# Patient Record
Sex: MALE | Race: WHITE | Employment: OTHER | URBAN - METROPOLITAN AREA
[De-identification: names, ages, dates, MRNs, and addresses within clinical notes are randomized per-mention and may not be internally consistent; named-entity substitution may affect disease eponyms.]

---

## 2017-01-03 ENCOUNTER — PATIENT MESSAGE (OUTPATIENT)
Dept: INTERNAL MEDICINE CLINIC | Facility: CLINIC | Age: 72
End: 2017-01-03

## 2017-01-03 RX ORDER — AMLODIPINE BESYLATE 10 MG/1
TABLET ORAL
Qty: 90 TABLET | Refills: 0 | Status: SHIPPED | OUTPATIENT
Start: 2017-01-03 | End: 2017-06-21

## 2017-01-03 RX ORDER — ATORVASTATIN CALCIUM 40 MG/1
TABLET, FILM COATED ORAL
Qty: 90 TABLET | Refills: 0 | Status: SHIPPED | OUTPATIENT
Start: 2017-01-03 | End: 2017-08-02

## 2017-01-03 RX ORDER — PIOGLITAZONEHYDROCHLORIDE 30 MG/1
TABLET ORAL
Qty: 90 TABLET | Refills: 0 | Status: SHIPPED | OUTPATIENT
Start: 2017-01-03 | End: 2017-01-25

## 2017-01-03 RX ORDER — LISINOPRIL AND HYDROCHLOROTHIAZIDE 20; 12.5 MG/1; MG/1
TABLET ORAL
Qty: 90 TABLET | Refills: 0 | Status: SHIPPED | OUTPATIENT
Start: 2017-01-03 | End: 2017-01-25

## 2017-01-03 RX ORDER — GLIMEPIRIDE 4 MG/1
TABLET ORAL
Qty: 180 TABLET | Refills: 0 | Status: SHIPPED | OUTPATIENT
Start: 2017-01-03 | End: 2017-06-21

## 2017-01-03 NOTE — TELEPHONE ENCOUNTER
From: Lyndon Chaudhry  To: Hiren Hutchison MD  Sent: 1/3/2017 12:51 PM CST  Subject: Prescription Question    As a result of [employer] changes in pharmacy network, my primary pharmacy will now be the following, and I have instituted a transfer of all prescr

## 2017-01-03 NOTE — TELEPHONE ENCOUNTER
New pharmacy request. meds pended for physician approval.   please advise as does not meet RN protocol for refill criteria  Pharmacy updated          diabetes Medications  Protocol Criteria:  · Appointment scheduled in the past 6 months or the next 3 month 02/09/2016   AST 23 02/09/2016   ALT 25 02/09/2016   BILT 0.6 02/09/2016   TP 6.9 02/09/2016   ALB 4.3 02/09/2016    02/09/2016   K 4.0 02/09/2016    02/09/2016   CO2 31 02/09/2016   GLOBULIN 2.6 02/09/2016   AGRATIO 1.7 02/09/2016   ANIONGAP 7

## 2017-01-04 ENCOUNTER — TELEPHONE (OUTPATIENT)
Dept: INTERNAL MEDICINE CLINIC | Facility: CLINIC | Age: 72
End: 2017-01-04

## 2017-01-04 DIAGNOSIS — C61 PROSTATE CANCER (HCC): Primary | ICD-10-CM

## 2017-01-04 NOTE — TELEPHONE ENCOUNTER
Spoke to [employer] @ [employer]. [employer] states patient needs a referral to see Dr. Lady Moreno for 4 month follow up. Patient is 4 months s/p seed implant with [employer].  Referral generated and routed to [facility] for approval.  Please fax

## 2017-01-04 NOTE — TELEPHONE ENCOUNTER
Referral for Dr. Yris Wilburn, was placed by Dr. Malu Rucker not Dr. Sharon Cruz. Message sent to Dr. Malu Rucekr as well as Urology staff for further advise. Thank you.

## 2017-01-04 NOTE — TELEPHONE ENCOUNTER
Nurses, please read string of bureaucratic/referral type messages and please advise if and what I need to do.   Thanks, Dr. Shira Fitzpatrick

## 2017-01-11 RX ORDER — GLIMEPIRIDE 4 MG/1
TABLET ORAL
Qty: 180 TABLET | Refills: 0 | Status: SHIPPED | OUTPATIENT
Start: 2017-01-11 | End: 2017-02-28 | Stop reason: CLARIF

## 2017-01-12 ENCOUNTER — PATIENT MESSAGE (OUTPATIENT)
Dept: INTERNAL MEDICINE CLINIC | Facility: CLINIC | Age: 72
End: 2017-01-12

## 2017-01-14 NOTE — TELEPHONE ENCOUNTER
From: Galen Meigs  To: Stephen Street MD  Sent: 1/12/2017 2:25 PM CST  Subject: Prescription Question    You sent a prescription renewal to [facility], but I had previously notified your office that due to changes in [facility] program it was Nancy

## 2017-01-14 NOTE — TELEPHONE ENCOUNTER
[facility] pharmacy deleted from profile. Original refill request came from [facility] on 1/11/17 electronically and approved by Dr Suri Wilson. Email sent to patient.

## 2017-01-25 ENCOUNTER — TELEPHONE (OUTPATIENT)
Dept: INTERNAL MEDICINE CLINIC | Facility: CLINIC | Age: 72
End: 2017-01-25

## 2017-01-25 RX ORDER — PIOGLITAZONEHYDROCHLORIDE 30 MG/1
TABLET ORAL
Qty: 90 TABLET | Refills: 0 | Status: SHIPPED | OUTPATIENT
Start: 2017-01-25 | End: 2017-12-04

## 2017-01-25 RX ORDER — ATORVASTATIN CALCIUM 40 MG/1
TABLET, FILM COATED ORAL
Qty: 90 TABLET | Refills: 0 | Status: SHIPPED | OUTPATIENT
Start: 2017-01-25 | End: 2017-02-28 | Stop reason: CLARIF

## 2017-01-25 RX ORDER — PIOGLITAZONEHYDROCHLORIDE 30 MG/1
TABLET ORAL
Qty: 90 TABLET | Refills: 0 | Status: SHIPPED | OUTPATIENT
Start: 2017-01-25 | End: 2017-02-28 | Stop reason: CLARIF

## 2017-01-25 RX ORDER — ATORVASTATIN CALCIUM 40 MG/1
TABLET, FILM COATED ORAL
Qty: 90 TABLET | Refills: 0 | Status: SHIPPED | OUTPATIENT
Start: 2017-01-25 | End: 2017-01-25

## 2017-01-25 RX ORDER — LISINOPRIL AND HYDROCHLOROTHIAZIDE 20; 12.5 MG/1; MG/1
TABLET ORAL
Qty: 90 TABLET | Refills: 0 | Status: SHIPPED | OUTPATIENT
Start: 2017-01-25 | End: 2017-02-28 | Stop reason: CLARIF

## 2017-01-25 RX ORDER — LISINOPRIL AND HYDROCHLOROTHIAZIDE 20; 12.5 MG/1; MG/1
TABLET ORAL
Qty: 90 TABLET | Refills: 0 | Status: SHIPPED | OUTPATIENT
Start: 2017-01-25 | End: 2019-02-11

## 2017-01-25 NOTE — TELEPHONE ENCOUNTER
Pt is calling state that the prescription sarah to the wrong pharm pt state that it was suppose to go to [facility]

## 2017-02-17 ENCOUNTER — PATIENT MESSAGE (OUTPATIENT)
Dept: INTERNAL MEDICINE CLINIC | Facility: CLINIC | Age: 72
End: 2017-02-17

## 2017-02-17 DIAGNOSIS — Z01.00 DIABETIC EYE EXAM (HCC): Primary | ICD-10-CM

## 2017-02-17 DIAGNOSIS — E11.9 DIABETIC EYE EXAM (HCC): Primary | ICD-10-CM

## 2017-02-20 ENCOUNTER — PATIENT OUTREACH (OUTPATIENT)
Dept: INTERNAL MEDICINE CLINIC | Facility: CLINIC | Age: 72
End: 2017-02-20

## 2017-02-20 DIAGNOSIS — E11.9 TYPE 2 DIABETES MELLITUS WITHOUT COMPLICATION, WITH LONG-TERM CURRENT USE OF INSULIN (HCC): Primary | ICD-10-CM

## 2017-02-20 DIAGNOSIS — Z79.4 TYPE 2 DIABETES MELLITUS WITHOUT COMPLICATION, WITH LONG-TERM CURRENT USE OF INSULIN (HCC): Primary | ICD-10-CM

## 2017-02-20 NOTE — PROGRESS NOTES
Spoke with pt. He agrees to have labs completed. Orders entered , instructed to fast for blood test.    PLAN:  Follow up in 1 month to review results.

## 2017-02-21 NOTE — TELEPHONE ENCOUNTER
From: Norval Goldmann  To: Manan Montez MD  Sent: 2/17/2017 9:39 AM CST  Subject: Non-Urgent Medical Question    Dr Clay Schaefer  I am due for annual physical and will make appointment.  Please request whatever tests are needed at this point and I will get them a

## 2017-02-23 ENCOUNTER — LAB ENCOUNTER (OUTPATIENT)
Dept: LAB | Age: 72
End: 2017-02-23
Attending: INTERNAL MEDICINE
Payer: MEDICARE

## 2017-02-23 DIAGNOSIS — E11.9 TYPE 2 DIABETES MELLITUS WITHOUT COMPLICATION, WITH LONG-TERM CURRENT USE OF INSULIN (HCC): ICD-10-CM

## 2017-02-23 DIAGNOSIS — Z79.4 TYPE 2 DIABETES MELLITUS WITHOUT COMPLICATION, WITH LONG-TERM CURRENT USE OF INSULIN (HCC): ICD-10-CM

## 2017-02-23 LAB
ALBUMIN SERPL BCP-MCNC: 3.9 G/DL (ref 3.5–4.8)
ALBUMIN/GLOB SERPL: 1.6 {RATIO} (ref 1–2)
ALP SERPL-CCNC: 70 U/L (ref 32–100)
ALT SERPL-CCNC: 156 U/L (ref 17–63)
ANION GAP SERPL CALC-SCNC: 10 MMOL/L (ref 0–18)
AST SERPL-CCNC: 65 U/L (ref 15–41)
BASOPHILS # BLD: 0.1 K/UL (ref 0–0.2)
BASOPHILS NFR BLD: 1 %
BILIRUB SERPL-MCNC: 0.7 MG/DL (ref 0.3–1.2)
BUN SERPL-MCNC: 13 MG/DL (ref 8–20)
BUN/CREAT SERPL: 17.1 (ref 10–20)
CALCIUM SERPL-MCNC: 8.8 MG/DL (ref 8.5–10.5)
CHLORIDE SERPL-SCNC: 104 MMOL/L (ref 95–110)
CHOLEST SERPL-MCNC: 121 MG/DL (ref 110–200)
CO2 SERPL-SCNC: 27 MMOL/L (ref 22–32)
CREAT SERPL-MCNC: 0.76 MG/DL (ref 0.5–1.5)
CREAT UR-MCNC: 140.5 MG/DL
EOSINOPHIL # BLD: 0.1 K/UL (ref 0–0.7)
EOSINOPHIL NFR BLD: 1 %
ERYTHROCYTE [DISTWIDTH] IN BLOOD BY AUTOMATED COUNT: 14.8 % (ref 11–15)
GLOBULIN PLAS-MCNC: 2.5 G/DL (ref 2.5–3.7)
GLUCOSE SERPL-MCNC: 89 MG/DL (ref 70–99)
HBA1C MFR BLD: 8.6 % (ref 4–6)
HCT VFR BLD AUTO: 42.7 % (ref 41–52)
HDLC SERPL-MCNC: 29 MG/DL
HGB BLD-MCNC: 13.8 G/DL (ref 13.5–17.5)
LDLC SERPL CALC-MCNC: 61 MG/DL (ref 0–99)
LYMPHOCYTES # BLD: 2.3 K/UL (ref 1–4)
LYMPHOCYTES NFR BLD: 22 %
MCH RBC QN AUTO: 25.4 PG (ref 27–32)
MCHC RBC AUTO-ENTMCNC: 32.3 G/DL (ref 32–37)
MCV RBC AUTO: 78.7 FL (ref 80–100)
MICROALBUMIN UR-MCNC: 1.3 MG/DL (ref 0–1.8)
MICROALBUMIN/CREAT UR: 9.3 MG/G{CREAT} (ref 0–20)
MONOCYTES # BLD: 0.9 K/UL (ref 0–1)
MONOCYTES NFR BLD: 9 %
NEUTROPHILS # BLD AUTO: 6.9 K/UL (ref 1.8–7.7)
NEUTROPHILS NFR BLD: 67 %
NONHDLC SERPL-MCNC: 92 MG/DL
OSMOLALITY UR CALC.SUM OF ELEC: 292 MOSM/KG (ref 275–295)
PLATELET # BLD AUTO: 180 K/UL (ref 140–400)
PMV BLD AUTO: 10.2 FL (ref 7.4–10.3)
POTASSIUM SERPL-SCNC: 3.5 MMOL/L (ref 3.3–5.1)
PROT SERPL-MCNC: 6.4 G/DL (ref 5.9–8.4)
RBC # BLD AUTO: 5.43 M/UL (ref 4.5–5.9)
SODIUM SERPL-SCNC: 141 MMOL/L (ref 136–144)
TRIGL SERPL-MCNC: 157 MG/DL (ref 1–149)
WBC # BLD AUTO: 10.4 K/UL (ref 4–11)

## 2017-02-23 PROCEDURE — 36415 COLL VENOUS BLD VENIPUNCTURE: CPT

## 2017-02-23 PROCEDURE — 83036 HEMOGLOBIN GLYCOSYLATED A1C: CPT

## 2017-02-23 PROCEDURE — 82570 ASSAY OF URINE CREATININE: CPT

## 2017-02-23 PROCEDURE — 85025 COMPLETE CBC W/AUTO DIFF WBC: CPT

## 2017-02-23 PROCEDURE — 80053 COMPREHEN METABOLIC PANEL: CPT

## 2017-02-23 PROCEDURE — 80061 LIPID PANEL: CPT

## 2017-02-23 PROCEDURE — 82043 UR ALBUMIN QUANTITATIVE: CPT

## 2017-02-28 ENCOUNTER — OFFICE VISIT (OUTPATIENT)
Dept: INTERNAL MEDICINE CLINIC | Facility: CLINIC | Age: 72
End: 2017-02-28

## 2017-02-28 VITALS
BODY MASS INDEX: 25.72 KG/M2 | DIASTOLIC BLOOD PRESSURE: 56 MMHG | HEART RATE: 98 BPM | SYSTOLIC BLOOD PRESSURE: 132 MMHG | HEIGHT: 72 IN | WEIGHT: 189.88 LBS

## 2017-02-28 DIAGNOSIS — C61 ADENOCARCINOMA OF PROSTATE (HCC): ICD-10-CM

## 2017-02-28 DIAGNOSIS — Z86.010 HISTORY OF COLON POLYPS: ICD-10-CM

## 2017-02-28 DIAGNOSIS — I10 ESSENTIAL HYPERTENSION: ICD-10-CM

## 2017-02-28 DIAGNOSIS — E11.9 INSULIN-REQUIRING OR DEPENDENT TYPE II DIABETES MELLITUS (HCC): ICD-10-CM

## 2017-02-28 DIAGNOSIS — E78.00 HYPERCHOLESTEROLEMIA: ICD-10-CM

## 2017-02-28 DIAGNOSIS — H47.20 OPTIC ATROPHY: ICD-10-CM

## 2017-02-28 DIAGNOSIS — R79.89 ABNORMAL LFTS: ICD-10-CM

## 2017-02-28 DIAGNOSIS — N52.1 ERECTILE DYSFUNCTION ASSOCIATED WITH TYPE 2 DIABETES MELLITUS (HCC): ICD-10-CM

## 2017-02-28 DIAGNOSIS — E11.69 ERECTILE DYSFUNCTION ASSOCIATED WITH TYPE 2 DIABETES MELLITUS (HCC): ICD-10-CM

## 2017-02-28 DIAGNOSIS — D50.9 IRON DEFICIENCY ANEMIA, UNSPECIFIED IRON DEFICIENCY ANEMIA TYPE: ICD-10-CM

## 2017-02-28 DIAGNOSIS — K57.30 DIVERTICULOSIS OF LARGE INTESTINE WITHOUT HEMORRHAGE: ICD-10-CM

## 2017-02-28 DIAGNOSIS — Z00.00 ANNUAL PHYSICAL EXAM: Primary | ICD-10-CM

## 2017-02-28 DIAGNOSIS — Z79.4 INSULIN-REQUIRING OR DEPENDENT TYPE II DIABETES MELLITUS (HCC): ICD-10-CM

## 2017-02-28 PROCEDURE — G0439 PPPS, SUBSEQ VISIT: HCPCS | Performed by: INTERNAL MEDICINE

## 2017-02-28 PROCEDURE — 96160 PT-FOCUSED HLTH RISK ASSMT: CPT | Performed by: INTERNAL MEDICINE

## 2017-02-28 NOTE — PATIENT INSTRUCTIONS
Please increase Lantus insulin to 48 units daily. Please continue your other medications. Continue to eat healthy, and try to increase exercise and lose a modest amount of weight. Please keep scheduled appointment with Urology in April.   Please see Opht non-screening if indicated for medical reasons    Electrocardiogram date Routine EKG is not a screening covered service except at the [place] to Medicare Visit    Abdominal aortic aneurysm screening (once between ages 73-68)  No results found for this or a Please get once after your 65th birthday    Hepatitis B for Moderate/High Risk No orders found for this or any previous visit.  Medium/high risk factors:   End-stage renal disease   Hemophiliacs who received Factor VIII or IX concentrates   Clients of laith

## 2017-02-28 NOTE — PROGRESS NOTES
HPI:   Tylor Mora is a 80year old male who presents for a MA (Medicare Advantage) [facility] [place] (Once per calendar year). He feels well this afternoon, and has no specific issues for discussion. He is retired, and lives with his wife.   Accu-Cheks pe Value Date   AST 65* 02/23/2017   * 02/23/2017   CA 8.8 02/23/2017   ALB 3.9 02/23/2017   TSH 1.70 11/21/2016   CREATSERUM 0.76 02/23/2017   GLU 89 02/23/2017        CBC  (most recent labs)     Lab Results  Component Value Date   WBC 10.4 02/23/2017 (Right, 1992); cataract (Left, 11-10); and Inguinal hernia repair (Right, 8/25/2015). His family history includes Colon Cancer in his mother; Diabetes in his father, paternal grandmother, paternal uncle, and sister;  Heart Disease in his father; [employer] restaurant:  Yes   I get confused about where sounds come from:  No I misunderstand some   words in a sentence and need to ask people to repeat themselves:  No   I especially have trouble understanding the speech of women and children:    No I have trouble 10/20/2008, 10/04/2010   • Influenza Vaccine, High Dose, Preserv Free 10/12/2016   • Influenza Vaccine, Preserv Free 09/10/2012, 09/12/2013   • Pneumococcal (Prevnar 13) 02/16/2016   • TDAP 01/14/2013   • Zoster (Shingles) 01/14/2013       ASSESSMENT AND O patient is instructed to get our office a copy of it for scanning into [employer]neview on file in Epic:    Noemi Cook has a Power of  for [employer] on file in [place].             PLAN:  The patient indicates understanding o 1-Yes    Does pain affect your day to day activities?: 0-No     Have you had any memory issues?: 0-No    Fall/Risk Scorin    Scoring Interpretation: 0 - 3 No Risk     Depression Screening (PHQ-2/PHQ-9): Over the LAST 2 WEEKS   Little interest or pleasu Fecal Occult Blood Annually No results found for: FOB No flowsheet data found.     Glaucoma Screening      Ophthalmology Visit Annually: Diabetics, FHx Glaucoma, AA>50, > 65 Data entered on: 1/9/2017   Last Dilated Eye Exam 2/26/2016       Prostate

## 2017-04-12 ENCOUNTER — APPOINTMENT (OUTPATIENT)
Dept: LAB | Age: 72
End: 2017-04-12
Attending: UROLOGY
Payer: MEDICARE

## 2017-04-12 DIAGNOSIS — C61 PROSTATE CANCER (HCC): ICD-10-CM

## 2017-04-12 PROCEDURE — 36415 COLL VENOUS BLD VENIPUNCTURE: CPT

## 2017-04-12 PROCEDURE — 84153 ASSAY OF PSA TOTAL: CPT

## 2017-04-17 ENCOUNTER — OFFICE VISIT (OUTPATIENT)
Dept: SURGERY | Facility: CLINIC | Age: 72
End: 2017-04-17

## 2017-04-17 VITALS
RESPIRATION RATE: 16 BRPM | DIASTOLIC BLOOD PRESSURE: 60 MMHG | BODY MASS INDEX: 25.06 KG/M2 | SYSTOLIC BLOOD PRESSURE: 140 MMHG | TEMPERATURE: 97 F | HEIGHT: 72 IN | HEART RATE: 98 BPM | WEIGHT: 185 LBS

## 2017-04-17 DIAGNOSIS — R35.1 NOCTURIA: ICD-10-CM

## 2017-04-17 DIAGNOSIS — C61 PROSTATE CANCER (HCC): Primary | ICD-10-CM

## 2017-04-17 PROCEDURE — G0463 HOSPITAL OUTPT CLINIC VISIT: HCPCS | Performed by: UROLOGY

## 2017-04-17 PROCEDURE — 99214 OFFICE O/P EST MOD 30 MIN: CPT | Performed by: UROLOGY

## 2017-04-17 NOTE — PATIENT INSTRUCTIONS
.  Please keep your appointment to see Dr. Jana Rivera in 3 months    2. Visit with me in 6 months, namely October 2017; blood draw for PSA--total and free before the visit.   Please abstain from any and all sexual activity for 5 days before the actual PSA blood

## 2017-04-17 NOTE — PROGRESS NOTES
HPI:    Patient ID: Lauryn Nicole is a 80year old male. HPI     1.   Voiding Dysfunction  Patient has current AUA score of 14, moderate voiding dysfunction category, compared to previous score of 19, moderate voiding dysfunction category, on 10/12/16 pe bone pain. Respiratory: Negative for chest tightness and shortness of breath. Cardiovascular: Negative for chest pain. Gastrointestinal: Negative for diarrhea, constipation and blood in stool.    Genitourinary: Negative for dysuria and hematuria (atiya 270 tablet Rfl: 0   Atorvastatin Calcium 40 MG Oral Tab TAKE 1 TABLET BY MOUTH ONCE DAILY.  Disp: 90 tablet Rfl: 0   insulin glargine (LANTUS) 100 UNIT/ML Subcutaneous Solution INJECT 40 UNITS BY SUBCUTANEOUS ROUTE DAILY (Patient taking differently: INJECT 5.4      Imaging tests---  None performed         ASSESSMENT/PLAN:   (C61) Prostate cancer ([facility] [address]  (primary encounter diagnosis)  Plan:  9/1/2016 BRACHYTHERAPY / Seed implant  +  Cystoscopy  under general anesthesia @ [place] [address].  On YOLY the pro that the record reflects my personal performance and is accurate and complete.   Dustin Springer MD, 4/17/2017, 10:13 AM

## 2017-05-11 ENCOUNTER — PATIENT MESSAGE (OUTPATIENT)
Dept: INTERNAL MEDICINE CLINIC | Facility: CLINIC | Age: 72
End: 2017-05-11

## 2017-05-11 DIAGNOSIS — E11.9 TYPE 2 DIABETES MELLITUS WITHOUT COMPLICATION, WITH LONG-TERM CURRENT USE OF INSULIN (HCC): Primary | ICD-10-CM

## 2017-05-11 DIAGNOSIS — R79.89 ABNORMAL LFTS: ICD-10-CM

## 2017-05-11 DIAGNOSIS — Z79.4 TYPE 2 DIABETES MELLITUS WITHOUT COMPLICATION, WITH LONG-TERM CURRENT USE OF INSULIN (HCC): Primary | ICD-10-CM

## 2017-05-12 NOTE — TELEPHONE ENCOUNTER
From: Cathy Coelho  To: Amy Owusu MD  Sent: 5/11/2017 1:35 PM CDT  Subject: Visit Follow-up Question    Dr Ahumada Repress  Please order the follow-up tests you require (A1C and enzymes) and I will make an appointment for follow-up visit once I complete the b

## 2017-05-18 ENCOUNTER — APPOINTMENT (OUTPATIENT)
Dept: LAB | Age: 72
End: 2017-05-18
Attending: INTERNAL MEDICINE
Payer: MEDICARE

## 2017-05-18 DIAGNOSIS — E11.9 TYPE 2 DIABETES MELLITUS WITHOUT COMPLICATION, WITH LONG-TERM CURRENT USE OF INSULIN (HCC): ICD-10-CM

## 2017-05-18 DIAGNOSIS — Z79.4 TYPE 2 DIABETES MELLITUS WITHOUT COMPLICATION, WITH LONG-TERM CURRENT USE OF INSULIN (HCC): ICD-10-CM

## 2017-05-18 DIAGNOSIS — R79.89 ABNORMAL LFTS: ICD-10-CM

## 2017-05-18 PROCEDURE — 84466 ASSAY OF TRANSFERRIN: CPT

## 2017-05-18 PROCEDURE — 80076 HEPATIC FUNCTION PANEL: CPT

## 2017-05-18 PROCEDURE — 83540 ASSAY OF IRON: CPT

## 2017-05-18 PROCEDURE — 82728 ASSAY OF FERRITIN: CPT

## 2017-05-18 PROCEDURE — 83036 HEMOGLOBIN GLYCOSYLATED A1C: CPT

## 2017-05-18 PROCEDURE — 36415 COLL VENOUS BLD VENIPUNCTURE: CPT

## 2017-05-18 PROCEDURE — 80074 ACUTE HEPATITIS PANEL: CPT

## 2017-05-18 PROCEDURE — 86038 ANTINUCLEAR ANTIBODIES: CPT

## 2017-05-22 ENCOUNTER — OFFICE VISIT (OUTPATIENT)
Dept: INTERNAL MEDICINE CLINIC | Facility: CLINIC | Age: 72
End: 2017-05-22

## 2017-05-22 VITALS
HEIGHT: 72 IN | DIASTOLIC BLOOD PRESSURE: 58 MMHG | WEIGHT: 191 LBS | BODY MASS INDEX: 25.87 KG/M2 | HEART RATE: 97 BPM | SYSTOLIC BLOOD PRESSURE: 132 MMHG

## 2017-05-22 DIAGNOSIS — C61 ADENOCARCINOMA OF PROSTATE (HCC): ICD-10-CM

## 2017-05-22 DIAGNOSIS — I10 ESSENTIAL HYPERTENSION: ICD-10-CM

## 2017-05-22 DIAGNOSIS — Z79.4 ENCOUNTER FOR LONG-TERM (CURRENT) USE OF INSULIN (HCC): ICD-10-CM

## 2017-05-22 DIAGNOSIS — R79.89 ABNORMAL LFTS: ICD-10-CM

## 2017-05-22 DIAGNOSIS — E11.69 ERECTILE DYSFUNCTION ASSOCIATED WITH TYPE 2 DIABETES MELLITUS (HCC): ICD-10-CM

## 2017-05-22 DIAGNOSIS — N52.1 ERECTILE DYSFUNCTION ASSOCIATED WITH TYPE 2 DIABETES MELLITUS (HCC): ICD-10-CM

## 2017-05-22 PROCEDURE — G0463 HOSPITAL OUTPT CLINIC VISIT: HCPCS | Performed by: INTERNAL MEDICINE

## 2017-05-22 PROCEDURE — 99213 OFFICE O/P EST LOW 20 MIN: CPT | Performed by: INTERNAL MEDICINE

## 2017-05-22 NOTE — PATIENT INSTRUCTIONS
Please increase Lantus insulin to 52 units daily. Please continue your other medications. Return visit in 3 months.

## 2017-05-22 NOTE — PROGRESS NOTES
Thompson Schirmer is a 80year old male. Patient presents with:  Diabetes  Hypertension  Hypercholesterolemia    HPI:   Mr. Nikkie Alejandro presents this afternoon for follow-up.   At his last visit in February, dose of Lantus insulin was increased from 44 units daily to Disp:  Rfl:    MULTIPLE VITAMIN OR Take one tablet by mouth daily Disp:  Rfl:        Ragweed                    Past Medical History   Diagnosis Date   • Insulin-requiring or dependent type II diabetes mellitus ([facility] [address] 1992   • Hypertension 2000   • Hypercho

## 2017-06-21 RX ORDER — GLIMEPIRIDE 4 MG/1
TABLET ORAL
Qty: 180 TABLET | Refills: 1 | Status: SHIPPED | OUTPATIENT
Start: 2017-06-21 | End: 2018-02-03

## 2017-06-21 RX ORDER — AMLODIPINE BESYLATE 10 MG/1
TABLET ORAL
Qty: 90 TABLET | Refills: 1 | Status: SHIPPED | OUTPATIENT
Start: 2017-06-21 | End: 2018-02-03

## 2017-07-24 RX ORDER — PIOGLITAZONEHYDROCHLORIDE 30 MG/1
TABLET ORAL
Qty: 90 TABLET | Refills: 1 | Status: SHIPPED | OUTPATIENT
Start: 2017-07-24 | End: 2018-02-03

## 2017-07-24 RX ORDER — LISINOPRIL AND HYDROCHLOROTHIAZIDE 20; 12.5 MG/1; MG/1
TABLET ORAL
Qty: 90 TABLET | Refills: 1 | Status: SHIPPED | OUTPATIENT
Start: 2017-07-24 | End: 2018-02-03

## 2017-08-02 RX ORDER — ATORVASTATIN CALCIUM 40 MG/1
TABLET, FILM COATED ORAL
Qty: 90 TABLET | Refills: 1 | Status: SHIPPED | OUTPATIENT
Start: 2017-08-02 | End: 2017-10-30

## 2017-08-02 NOTE — TELEPHONE ENCOUNTER
LOV: 5/22/17  LRF: 1/3/17    Next scheduled appt:  No future apts scheduled at this time.   Pended rx for signature

## 2017-08-02 NOTE — TELEPHONE ENCOUNTER
Per Ami they have sent this request already. Pt would like a refill on atorvastatin medication. Pharmacy: [facility] (listed)       Current Outpatient Prescriptions:  Atorvastatin Calcium 40 MG Oral Tab TAKE 1 TABLET BY MOUTH ONCE DAILY.  Disp: 90 t

## 2017-08-31 ENCOUNTER — APPOINTMENT (OUTPATIENT)
Dept: LAB | Age: 72
End: 2017-08-31
Attending: INTERNAL MEDICINE
Payer: MEDICARE

## 2017-08-31 DIAGNOSIS — E11.9 TYPE 2 DIABETES MELLITUS WITHOUT COMPLICATION, WITH LONG-TERM CURRENT USE OF INSULIN (HCC): ICD-10-CM

## 2017-08-31 DIAGNOSIS — Z79.4 TYPE 2 DIABETES MELLITUS WITHOUT COMPLICATION, WITH LONG-TERM CURRENT USE OF INSULIN (HCC): ICD-10-CM

## 2017-08-31 LAB — HBA1C MFR BLD: 7.5 % (ref 4–6)

## 2017-08-31 PROCEDURE — 83036 HEMOGLOBIN GLYCOSYLATED A1C: CPT

## 2017-08-31 PROCEDURE — 36415 COLL VENOUS BLD VENIPUNCTURE: CPT

## 2017-09-01 ENCOUNTER — TELEPHONE (OUTPATIENT)
Dept: INTERNAL MEDICINE CLINIC | Facility: CLINIC | Age: 72
End: 2017-09-01

## 2017-09-01 DIAGNOSIS — Z86.010 HISTORY OF COLON POLYPS: Primary | ICD-10-CM

## 2017-09-01 NOTE — TELEPHONE ENCOUNTER
Pt sent a message via [employer] requesting a referral to see GI for a colonoscopy, please. Please contact pt or push referral via [employer] once completed.

## 2017-09-06 ENCOUNTER — TELEPHONE (OUTPATIENT)
Dept: GASTROENTEROLOGY | Facility: CLINIC | Age: 72
End: 2017-09-06

## 2017-09-06 DIAGNOSIS — Z86.010 HX OF COLONIC POLYPS: Primary | ICD-10-CM

## 2017-09-06 NOTE — TELEPHONE ENCOUNTER
Pt should have been notified of at least 72 hour turnaround scheduling time.   Not noted below by CSS staff    Last Procedure:  8/6/2012  Last Diagnosis:  Personal history of adenomatous colon polyps  Recalled for (years):  5 years  Sedation used previously

## 2017-09-06 NOTE — TELEPHONE ENCOUNTER
The patient's chart has been reviewed. Okay to schedule pt for 5 year colonoscopy recall r/t history of adenomatous colon polyps with Dr. Jeff Newberry. Advise IV Twilight sedation with dose Colyte (eRx) preparation.      May continue all medications list

## 2017-09-13 NOTE — TELEPHONE ENCOUNTER
Scheduled for:  Colonoscopy - [id number]  Provider Name:  Dr. Faraz Montes  Date:  12/4/17  Location:  [facility]  Sedation:  IV  Time:  10:00 am (pt is aware to arrive at 9:00 am)  Prep:  Colyte, Prep instructions were given to pt over the phone, pt verbalized understanding.

## 2017-09-13 NOTE — TELEPHONE ENCOUNTER
Routed to  RNs for Dr Lizbeth Schulz to address diabetic meds prior to colonoscopy    -glimepiride  -Lantus  -metformin  -actos

## 2017-09-19 RX ORDER — INSULIN GLARGINE 100 [IU]/ML
INJECTION, SOLUTION SUBCUTANEOUS
Qty: 10 ML | Refills: 5 | Status: SHIPPED | OUTPATIENT
Start: 2017-09-19 | End: 2018-02-03

## 2017-09-19 NOTE — TELEPHONE ENCOUNTER
Please call patient. Recommend he take metformin, Actos and glimepiride as prescribed through the day prior to colonoscopy, then hold these medications on the morning of colonoscopy, resuming the next day.   Recommend he take Lantus insulin as prescribed e

## 2017-09-25 NOTE — TELEPHONE ENCOUNTER
Pt called, message per Dr mccullough.   Verbalized understanding and compliance  Also sent written directions to pt via [employer] so that he can reference them closer to procedure date

## 2017-10-04 RX ORDER — NAPROXEN SODIUM 220 MG
TABLET ORAL
Qty: 100 EACH | Refills: 3 | Status: SHIPPED | OUTPATIENT
Start: 2017-10-04 | End: 2018-10-18

## 2017-10-06 ENCOUNTER — APPOINTMENT (OUTPATIENT)
Dept: LAB | Age: 72
End: 2017-10-06
Attending: UROLOGY
Payer: MEDICARE

## 2017-10-06 DIAGNOSIS — C61 PROSTATE CANCER (HCC): ICD-10-CM

## 2017-10-06 PROCEDURE — 84153 ASSAY OF PSA TOTAL: CPT

## 2017-10-06 PROCEDURE — 36415 COLL VENOUS BLD VENIPUNCTURE: CPT

## 2017-10-23 ENCOUNTER — OFFICE VISIT (OUTPATIENT)
Dept: SURGERY | Facility: CLINIC | Age: 72
End: 2017-10-23

## 2017-10-23 VITALS
DIASTOLIC BLOOD PRESSURE: 60 MMHG | RESPIRATION RATE: 16 BRPM | WEIGHT: 184 LBS | BODY MASS INDEX: 24.92 KG/M2 | SYSTOLIC BLOOD PRESSURE: 130 MMHG | HEIGHT: 72 IN | HEART RATE: 93 BPM | TEMPERATURE: 98 F

## 2017-10-23 DIAGNOSIS — C61 PROSTATE CANCER (HCC): Primary | ICD-10-CM

## 2017-10-23 DIAGNOSIS — R35.1 NOCTURIA: ICD-10-CM

## 2017-10-23 PROCEDURE — G0463 HOSPITAL OUTPT CLINIC VISIT: HCPCS | Performed by: UROLOGY

## 2017-10-23 PROCEDURE — 99214 OFFICE O/P EST MOD 30 MIN: CPT | Performed by: UROLOGY

## 2017-10-23 NOTE — PATIENT INSTRUCTIONS
1.  If you wish to wake up less often at night, restrict fluids for 3 hours before bedtime; avoid alcohol and caffeinated beverages in the evening which would make nocturia (waking up at night) worse.     2.  Keep your appointment to see Dr. Jana Rivera 3 months can’t. Prostate cancer may grow into nearby organs or spread to nearby lymph nodes. Lymph nodes are small organs around the body that are part of the immune system. In some cases, the cancer spreads to bones or organs in distant parts of the body.  This is

## 2017-10-23 NOTE — PROGRESS NOTES
HPI:    Patient ID: Raz Freedman is a 71year old male. HPI     1.   Voiding Dysfunction  Patient has current AUA score of 10, moderate voiding dysfunction category, improved compared to previous score of 14, moderate voiding dysfunction category, on 4/ prostate nml, 20 grams, no nodules or indurations. Review of Systems   Constitutional: Negative for fever. HENT: Negative for voice change. Respiratory: Negative for chest tightness and shortness of breath.     Cardiovascular: Negative for chest hector PACKS) 240 g Oral Recon Soln As directed per GI consult notes Disp: 1 Bottle Rfl: 0   atorvastatin 40 MG Oral Tab TAKE 1 TABLET BY MOUTH ONCE DAILY.  Disp: 90 tablet Rfl: 1   PIOGLITAZONE HCL 30 MG Oral Tab TAKE 1 TABLET BY MOUTH EVERY DAY Disp: 90 tablet R TempSrc: Oral   Weight: 184 lb (83.5 kg)   Height: 6' (1.829 m)         Body mass index is 24.95 kg/m².     Laboratories  10/06/2017 PSA = 0.1  4/12/17 PSA = 0.1  5/9/2016 PSA = 5.5; % Free PSA = 20 January 2017 PSA = 0.15 (as per pt; blood test ordere Avoid any and all sexual activity for 5 days before the actual PSA blood draw. No orders of the defined types were placed in this encounter.       Meds This Visit:  No prescriptions requested or ordered in this encounter    Imaging & Referrals:  None

## 2017-10-30 RX ORDER — ATORVASTATIN CALCIUM 40 MG/1
TABLET, FILM COATED ORAL
Qty: 90 TABLET | Refills: 0 | Status: SHIPPED | OUTPATIENT
Start: 2017-10-30 | End: 2019-02-11

## 2017-11-17 ENCOUNTER — PATIENT MESSAGE (OUTPATIENT)
Dept: INTERNAL MEDICINE CLINIC | Facility: CLINIC | Age: 72
End: 2017-11-17

## 2017-11-17 DIAGNOSIS — C61 MALIGNANT NEOPLASM OF PROSTATE (HCC): Primary | ICD-10-CM

## 2017-11-21 NOTE — TELEPHONE ENCOUNTER
From: [place] Wells  To: Kely Pimentel MD  Sent: 11/17/2017 9:24 AM CST  Subject: Referral Request    In January I am supposed to visit Dr. Ray Lau at [facility] for a follow-up on the prostrate seed implant and I will need a referral

## 2017-12-04 ENCOUNTER — HOSPITAL ENCOUNTER (OUTPATIENT)
Facility: HOSPITAL | Age: 72
Setting detail: HOSPITAL OUTPATIENT SURGERY
Discharge: HOME | End: 2017-12-04
Attending: INTERNAL MEDICINE | Admitting: INTERNAL MEDICINE
Payer: MEDICARE

## 2017-12-04 ENCOUNTER — SURGERY (OUTPATIENT)
Age: 72
End: 2017-12-04

## 2017-12-04 VITALS
SYSTOLIC BLOOD PRESSURE: 133 MMHG | DIASTOLIC BLOOD PRESSURE: 73 MMHG | HEIGHT: 72 IN | WEIGHT: 180 LBS | BODY MASS INDEX: 24.38 KG/M2 | OXYGEN SATURATION: 95 % | HEART RATE: 86 BPM | RESPIRATION RATE: 18 BRPM

## 2017-12-04 DIAGNOSIS — Z86.010 HX OF COLONIC POLYPS: ICD-10-CM

## 2017-12-04 PROCEDURE — 0DBK8ZX EXCISION OF ASCENDING COLON, VIA NATURAL OR ARTIFICIAL OPENING ENDOSCOPIC, DIAGNOSTIC: ICD-10-PCS | Performed by: INTERNAL MEDICINE

## 2017-12-04 PROCEDURE — 45380 COLONOSCOPY AND BIOPSY: CPT | Performed by: INTERNAL MEDICINE

## 2017-12-04 PROCEDURE — 45385 COLONOSCOPY W/LESION REMOVAL: CPT | Performed by: INTERNAL MEDICINE

## 2017-12-04 PROCEDURE — 0DBH8ZX EXCISION OF CECUM, VIA NATURAL OR ARTIFICIAL OPENING ENDOSCOPIC, DIAGNOSTIC: ICD-10-PCS | Performed by: INTERNAL MEDICINE

## 2017-12-04 PROCEDURE — 99153 MOD SED SAME PHYS/QHP EA: CPT | Performed by: INTERNAL MEDICINE

## 2017-12-04 PROCEDURE — 99152 MOD SED SAME PHYS/QHP 5/>YRS: CPT | Performed by: INTERNAL MEDICINE

## 2017-12-04 RX ORDER — MIDAZOLAM HYDROCHLORIDE 1 MG/ML
INJECTION INTRAMUSCULAR; INTRAVENOUS
Status: DISCONTINUED | OUTPATIENT
Start: 2017-12-04 | End: 2017-12-04

## 2017-12-04 RX ORDER — SODIUM CHLORIDE, SODIUM LACTATE, POTASSIUM CHLORIDE, CALCIUM CHLORIDE 600; 310; 30; 20 MG/100ML; MG/100ML; MG/100ML; MG/100ML
INJECTION, SOLUTION INTRAVENOUS CONTINUOUS
Status: DISCONTINUED | OUTPATIENT
Start: 2017-12-04 | End: 2017-12-04

## 2017-12-04 RX ORDER — MIDAZOLAM HYDROCHLORIDE 1 MG/ML
1 INJECTION INTRAMUSCULAR; INTRAVENOUS EVERY 5 MIN PRN
Status: DISCONTINUED | OUTPATIENT
Start: 2017-12-04 | End: 2017-12-04

## 2017-12-04 RX ORDER — SODIUM CHLORIDE 0.9 % (FLUSH) 0.9 %
10 SYRINGE (ML) INJECTION AS NEEDED
Status: DISCONTINUED | OUTPATIENT
Start: 2017-12-04 | End: 2017-12-04

## 2017-12-04 NOTE — OPERATIVE REPORT
[facility] Endoscopy Report      Date of Procedure:  12/04/17      Preoperative Diagnosis:  1. Personal history of adenomatous colon polyps  2. Colorectal cancer screening        Postoperative Diagnosis:  1.   Small ascending colon polyp 5–6 mm sessile polyp which was cold snare excised and retrieved via suction. No ongoing bleeding.   There were no other colonic polyps, definite diverticula, (minimal diverticulosis was seen on the patient's last colonoscopy) mass lesions, vascular anomali

## 2017-12-04 NOTE — H&P
History & Physical Examination    Patient Name: Deacon Arriola  MRN: [id number]  [facility]: [id number]  YOB: 1945    Diagnosis: Personal history of adenomatous colon polyps, colorectal cancer screening        Prescriptions Prior to Admission:  ATOR Continuous   Midazolam HCl (VERSED) 2 MG/2ML injection 1 mg 1 mg Intravenous Q5 Min PRN   fentaNYL citrate (SUBLIMAZE) 0.05 MG/ML injection 25 mcg 25 mcg Intravenous Q5 Min PRN       Allergies:   Ragweed                     Past Medical History:   Maninder Jin

## 2017-12-11 ENCOUNTER — TELEPHONE (OUTPATIENT)
Dept: GASTROENTEROLOGY | Facility: CLINIC | Age: 72
End: 2017-12-11

## 2017-12-11 DIAGNOSIS — K63.9 NODULE OF COLON: Primary | ICD-10-CM

## 2017-12-12 NOTE — TELEPHONE ENCOUNTER
Future Appointments  Date Time Provider Sharon Blaire   12/13/2017 1:30 PM [facility] CT RM1 [facility].  CT EM [place]

## 2017-12-12 NOTE — TELEPHONE ENCOUNTER
GI RN staff: I have ordered a CT scan of the abdomen and pelvis for a nodule versus extrinsic compression of the cecum seen at colonoscopy. I am uncertain whether the patient requires a preauthorization. Please contact the patient when he may schedule.

## 2017-12-12 NOTE — TELEPHONE ENCOUNTER
Pt contacted and informed that he may schedule his CT scan at this time. I provided him the number to central scheduling of [phone] so that he may schedule, pt verbalized understanding and states he will arrange this today.  No further questions at [facility]

## 2017-12-13 ENCOUNTER — TELEPHONE (OUTPATIENT)
Dept: GASTROENTEROLOGY | Facility: CLINIC | Age: 72
End: 2017-12-13

## 2017-12-13 ENCOUNTER — HOSPITAL ENCOUNTER (OUTPATIENT)
Dept: CT IMAGING | Age: 72
Discharge: HOME | End: 2017-12-13
Attending: INTERNAL MEDICINE
Payer: MEDICARE

## 2017-12-13 DIAGNOSIS — K63.9 NODULE OF COLON: ICD-10-CM

## 2017-12-13 PROCEDURE — 82565 ASSAY OF CREATININE: CPT

## 2017-12-13 PROCEDURE — 74177 CT ABD & PELVIS W/CONTRAST: CPT | Performed by: INTERNAL MEDICINE

## 2017-12-13 NOTE — TELEPHONE ENCOUNTER
----- Message from Hernandez Day MD sent at 12/11/2017  7:53 PM CST -----  I spoke to the patient. He is feeling well. He had a solitary small tubular adenoma removed. I have discussed the significance.   I have recommended a follow-up colonoscopy

## 2018-01-09 ENCOUNTER — TELEPHONE (OUTPATIENT)
Dept: INTERNAL MEDICINE CLINIC | Facility: CLINIC | Age: 73
End: 2018-01-09

## 2018-01-12 ENCOUNTER — TELEPHONE (OUTPATIENT)
Dept: INTERNAL MEDICINE CLINIC | Facility: CLINIC | Age: 73
End: 2018-01-12

## 2018-01-12 DIAGNOSIS — C61 PROSTATE CANCER (HCC): Primary | ICD-10-CM

## 2018-01-29 ENCOUNTER — TELEPHONE (OUTPATIENT)
Dept: INTERNAL MEDICINE CLINIC | Facility: CLINIC | Age: 73
End: 2018-01-29

## 2018-01-29 DIAGNOSIS — E11.9 INSULIN-REQUIRING OR DEPENDENT TYPE II DIABETES MELLITUS (HCC): Primary | ICD-10-CM

## 2018-01-29 DIAGNOSIS — Z79.4 INSULIN-REQUIRING OR DEPENDENT TYPE II DIABETES MELLITUS (HCC): Primary | ICD-10-CM

## 2018-01-29 NOTE — TELEPHONE ENCOUNTER
Patient returned call and scheduled AHA for 2/28/18. If appropriate please sign pending lab orders. Thanks.

## 2018-02-03 RX ORDER — PIOGLITAZONEHYDROCHLORIDE 30 MG/1
TABLET ORAL
Qty: 90 TABLET | Refills: 0 | Status: SHIPPED | OUTPATIENT
Start: 2018-02-03 | End: 2018-05-04

## 2018-02-03 RX ORDER — AMLODIPINE BESYLATE 10 MG/1
TABLET ORAL
Qty: 90 TABLET | Refills: 0 | Status: SHIPPED | OUTPATIENT
Start: 2018-02-03 | End: 2018-05-02

## 2018-02-03 RX ORDER — INSULIN GLARGINE 100 [IU]/ML
INJECTION, SOLUTION SUBCUTANEOUS
Qty: 10 ML | Refills: 0 | Status: SHIPPED | OUTPATIENT
Start: 2018-02-03 | End: 2018-03-02

## 2018-02-03 RX ORDER — ATORVASTATIN CALCIUM 40 MG/1
TABLET, FILM COATED ORAL
Qty: 90 TABLET | Refills: 0 | Status: SHIPPED | OUTPATIENT
Start: 2018-02-03 | End: 2018-02-28

## 2018-02-03 RX ORDER — LISINOPRIL AND HYDROCHLOROTHIAZIDE 20; 12.5 MG/1; MG/1
TABLET ORAL
Qty: 90 TABLET | Refills: 0 | Status: SHIPPED | OUTPATIENT
Start: 2018-02-03 | End: 2018-02-28

## 2018-02-03 RX ORDER — GLIMEPIRIDE 4 MG/1
TABLET ORAL
Qty: 180 TABLET | Refills: 0 | Status: SHIPPED | OUTPATIENT
Start: 2018-02-03 | End: 2018-05-02

## 2018-02-05 ENCOUNTER — PATIENT MESSAGE (OUTPATIENT)
Dept: INTERNAL MEDICINE CLINIC | Facility: CLINIC | Age: 73
End: 2018-02-05

## 2018-02-05 DIAGNOSIS — H47.20 OPTIC ATROPHY: Primary | ICD-10-CM

## 2018-02-05 NOTE — TELEPHONE ENCOUNTER
From: Lyndon Chaudhry  To: Hiren Hutchison MD  Sent: 2/5/2018 2:25 PM CST  Subject: Referral Request    Dr Jefferson Delvalle    I am due for the annual dilated retinal exam, and would appreciate your referral to Dr Inocencia Mora at [facility] where I have been a

## 2018-02-22 ENCOUNTER — APPOINTMENT (OUTPATIENT)
Dept: LAB | Age: 73
End: 2018-02-22
Attending: INTERNAL MEDICINE
Payer: MEDICARE

## 2018-02-22 DIAGNOSIS — E11.9 INSULIN-REQUIRING OR DEPENDENT TYPE II DIABETES MELLITUS (HCC): ICD-10-CM

## 2018-02-22 DIAGNOSIS — Z79.4 INSULIN-REQUIRING OR DEPENDENT TYPE II DIABETES MELLITUS (HCC): ICD-10-CM

## 2018-02-22 DIAGNOSIS — Z79.4 CONTROLLED TYPE 2 DIABETES MELLITUS WITH HYPERGLYCEMIA, WITH LONG-TERM CURRENT USE OF INSULIN (HCC): ICD-10-CM

## 2018-02-22 DIAGNOSIS — E11.65 CONTROLLED TYPE 2 DIABETES MELLITUS WITH HYPERGLYCEMIA, WITH LONG-TERM CURRENT USE OF INSULIN (HCC): ICD-10-CM

## 2018-02-22 LAB
ALBUMIN SERPL BCP-MCNC: 4.1 G/DL (ref 3.5–4.8)
ALBUMIN/GLOB SERPL: 1.4 {RATIO} (ref 1–2)
ALP SERPL-CCNC: 72 U/L (ref 32–100)
ALT SERPL-CCNC: 27 U/L (ref 17–63)
ANION GAP SERPL CALC-SCNC: 9 MMOL/L (ref 0–18)
AST SERPL-CCNC: 23 U/L (ref 15–41)
BILIRUB SERPL-MCNC: 0.7 MG/DL (ref 0.3–1.2)
BUN SERPL-MCNC: 16 MG/DL (ref 8–20)
BUN/CREAT SERPL: 19 (ref 10–20)
CALCIUM SERPL-MCNC: 9.2 MG/DL (ref 8.5–10.5)
CHLORIDE SERPL-SCNC: 104 MMOL/L (ref 95–110)
CHOLEST SERPL-MCNC: 124 MG/DL (ref 110–200)
CO2 SERPL-SCNC: 28 MMOL/L (ref 22–32)
CREAT SERPL-MCNC: 0.84 MG/DL (ref 0.5–1.5)
CREAT UR-MCNC: 202.3 MG/DL
ERYTHROCYTE [DISTWIDTH] IN BLOOD BY AUTOMATED COUNT: 14 % (ref 11–15)
GLOBULIN PLAS-MCNC: 2.9 G/DL (ref 2.5–3.7)
GLUCOSE SERPL-MCNC: 68 MG/DL (ref 70–99)
HBA1C MFR BLD: 7.6 % (ref 4–6)
HCT VFR BLD AUTO: 43.8 % (ref 41–52)
HDLC SERPL-MCNC: 35 MG/DL
HGB BLD-MCNC: 14.4 G/DL (ref 13.5–17.5)
LDLC SERPL CALC-MCNC: 63 MG/DL (ref 0–99)
MCH RBC QN AUTO: 26.4 PG (ref 27–32)
MCHC RBC AUTO-ENTMCNC: 32.9 G/DL (ref 32–37)
MCV RBC AUTO: 80.4 FL (ref 80–100)
MICROALBUMIN UR-MCNC: 2.4 MG/DL (ref 0–1.8)
MICROALBUMIN/CREAT UR: 11.9 MG/G{CREAT} (ref 0–20)
NONHDLC SERPL-MCNC: 89 MG/DL
OSMOLALITY UR CALC.SUM OF ELEC: 291 MOSM/KG (ref 275–295)
PATIENT FASTING: YES
PLATELET # BLD AUTO: 219 K/UL (ref 140–400)
PMV BLD AUTO: 9.3 FL (ref 7.4–10.3)
POTASSIUM SERPL-SCNC: 3.9 MMOL/L (ref 3.3–5.1)
PROT SERPL-MCNC: 7 G/DL (ref 5.9–8.4)
RBC # BLD AUTO: 5.45 M/UL (ref 4.5–5.9)
SODIUM SERPL-SCNC: 141 MMOL/L (ref 136–144)
TRIGL SERPL-MCNC: 129 MG/DL (ref 1–149)
WBC # BLD AUTO: 9.6 K/UL (ref 4–11)

## 2018-02-22 PROCEDURE — 82043 UR ALBUMIN QUANTITATIVE: CPT

## 2018-02-22 PROCEDURE — 85027 COMPLETE CBC AUTOMATED: CPT

## 2018-02-22 PROCEDURE — 83036 HEMOGLOBIN GLYCOSYLATED A1C: CPT

## 2018-02-22 PROCEDURE — 82570 ASSAY OF URINE CREATININE: CPT

## 2018-02-22 PROCEDURE — 80053 COMPREHEN METABOLIC PANEL: CPT

## 2018-02-22 PROCEDURE — 80061 LIPID PANEL: CPT

## 2018-02-22 PROCEDURE — 36415 COLL VENOUS BLD VENIPUNCTURE: CPT

## 2018-02-27 PROBLEM — N39.8 VOIDING DYSFUNCTION: Status: ACTIVE | Noted: 2018-02-27

## 2018-02-27 PROBLEM — H54.40 BLIND RIGHT EYE: Status: ACTIVE | Noted: 2018-02-27

## 2018-02-27 PROBLEM — R35.1 NOCTURIA: Status: ACTIVE | Noted: 2018-02-27

## 2018-02-28 ENCOUNTER — OFFICE VISIT (OUTPATIENT)
Dept: INTERNAL MEDICINE CLINIC | Facility: CLINIC | Age: 73
End: 2018-02-28

## 2018-02-28 VITALS
SYSTOLIC BLOOD PRESSURE: 134 MMHG | HEIGHT: 72 IN | WEIGHT: 187.88 LBS | BODY MASS INDEX: 25.45 KG/M2 | HEART RATE: 94 BPM | DIASTOLIC BLOOD PRESSURE: 62 MMHG | RESPIRATION RATE: 20 BRPM | TEMPERATURE: 98 F

## 2018-02-28 DIAGNOSIS — H47.20 OPTIC ATROPHY: ICD-10-CM

## 2018-02-28 DIAGNOSIS — Z79.4 TYPE 2 DIABETES MELLITUS WITHOUT COMPLICATION, WITH LONG-TERM CURRENT USE OF INSULIN (HCC): ICD-10-CM

## 2018-02-28 DIAGNOSIS — E78.00 HYPERCHOLESTEROLEMIA: ICD-10-CM

## 2018-02-28 DIAGNOSIS — C61 ADENOCARCINOMA OF PROSTATE (HCC): ICD-10-CM

## 2018-02-28 DIAGNOSIS — D50.9 IRON DEFICIENCY ANEMIA, UNSPECIFIED IRON DEFICIENCY ANEMIA TYPE: ICD-10-CM

## 2018-02-28 DIAGNOSIS — N52.1 ERECTILE DYSFUNCTION ASSOCIATED WITH TYPE 2 DIABETES MELLITUS (HCC): ICD-10-CM

## 2018-02-28 DIAGNOSIS — E11.9 TYPE 2 DIABETES MELLITUS WITHOUT COMPLICATION, WITH LONG-TERM CURRENT USE OF INSULIN (HCC): ICD-10-CM

## 2018-02-28 DIAGNOSIS — Z00.00 ENCOUNTER FOR ANNUAL HEALTH EXAMINATION: ICD-10-CM

## 2018-02-28 DIAGNOSIS — E11.69 ERECTILE DYSFUNCTION ASSOCIATED WITH TYPE 2 DIABETES MELLITUS (HCC): ICD-10-CM

## 2018-02-28 DIAGNOSIS — I10 ESSENTIAL HYPERTENSION: ICD-10-CM

## 2018-02-28 DIAGNOSIS — D12.6 ADENOMATOUS POLYP OF COLON, UNSPECIFIED PART OF COLON: ICD-10-CM

## 2018-02-28 DIAGNOSIS — Z00.00 ANNUAL PHYSICAL EXAM: Primary | ICD-10-CM

## 2018-02-28 DIAGNOSIS — K57.30 DIVERTICULOSIS OF LARGE INTESTINE WITHOUT HEMORRHAGE: ICD-10-CM

## 2018-02-28 PROBLEM — R35.1 NOCTURIA: Status: RESOLVED | Noted: 2018-02-27 | Resolved: 2018-02-28

## 2018-02-28 PROBLEM — N39.8 VOIDING DYSFUNCTION: Status: RESOLVED | Noted: 2018-02-27 | Resolved: 2018-02-28

## 2018-02-28 PROBLEM — H54.40 BLIND RIGHT EYE: Status: RESOLVED | Noted: 2018-02-27 | Resolved: 2018-02-28

## 2018-02-28 PROCEDURE — 96160 PT-FOCUSED HLTH RISK ASSMT: CPT | Performed by: INTERNAL MEDICINE

## 2018-02-28 PROCEDURE — G0009 ADMIN PNEUMOCOCCAL VACCINE: HCPCS | Performed by: INTERNAL MEDICINE

## 2018-02-28 PROCEDURE — G0439 PPPS, SUBSEQ VISIT: HCPCS | Performed by: INTERNAL MEDICINE

## 2018-02-28 PROCEDURE — 90732 PPSV23 VACC 2 YRS+ SUBQ/IM: CPT | Performed by: INTERNAL MEDICINE

## 2018-02-28 RX ORDER — AMLODIPINE BESYLATE 10 MG/1
10 TABLET ORAL
COMMUNITY
End: 2018-02-28

## 2018-02-28 RX ORDER — GLIMEPIRIDE 4 MG/1
4 TABLET ORAL
COMMUNITY
End: 2018-02-28

## 2018-02-28 RX ORDER — PIOGLITAZONEHYDROCHLORIDE 45 MG/1
45 TABLET ORAL
COMMUNITY
End: 2018-02-28

## 2018-02-28 RX ORDER — ATORVASTATIN CALCIUM 40 MG/1
40 TABLET, FILM COATED ORAL
COMMUNITY
End: 2018-02-28

## 2018-02-28 RX ORDER — LISINOPRIL AND HYDROCHLOROTHIAZIDE 20; 12.5 MG/1; MG/1
1 TABLET ORAL
COMMUNITY
End: 2018-02-28

## 2018-02-28 NOTE — PROGRESS NOTES
HPI:   Eboni Carrillo is a 71year old male who presents this afternoon for a MA (Medicare Advantage) Supervisit (Once per calendar year). He feels well. No specific issues for discussion today.   Fasting Accu-Cheks performed daily typically 70s with sara in [address] [place] [address] Rd. Not Discussed         He has a Power of  for [employer] on file in [address] [place] [address] Rd. He has never smoked tobacco.    CAGE Alcohol screening   Raz Tano was screened for Alcohol abuse and had a score of 0 so is at low risk.      Patient Care TAKE 1 TABLET BY MOUTH ONCE DAILY   ATORVASTATIN 40 MG Oral Tab TAKE 1 TABLET BY MOUTH EVERY DAY   INSULIN SYRINGE 31G X 5/16\" 0.5 ML Does not apply Misc USE FOR INJECTION OF INSULIN   LISINOPRIL-HYDROCHLOROTHIAZIDE 20-12.5 MG Oral Tab TAKE 1 TABLET BY MO urinary frequency dysuria or hematuria  MUSCULOSKELETAL: No leg swelling. No foot ulcerations or lesions  NEURO: No headaches.   No numbness or tingling in either foot      EXAM:   /62   Pulse 94   Temp 98 °F (36.7 °C) (Oral)   Resp 20   Ht 6' (1.829 Both Eyes Chart Acuity: 20/20   Able To Tolerate Visual Acuity: Yes        EXAM:   GENERAL: Pleasant male appearing well in no distress  /62   Pulse 94   Temp 98 °F (36.7 °C) (Oral)   Resp 20   Ht 6' (1.829 m)   Wt 187 lb 14.4 oz (85.2 kg)   BMI 25. 4 ([facility] [address]  Well-controlled without endorgan damage    Essential hypertension  Well-controlled    Hypercholesterolemia  Controlled on statin therapy    Adenomatous polyp of colon, unspecified part of colon  Up-to-date on colonoscopy    Iron deficiency anemia, u Screening     LDL Annually LDL Cholesterol (mg/dL)   Date Value   02/22/2018 63   02/09/2016 61        EKG - w/ Initial Preventative Physical Exam only, or if medically necessary Electrocardiogram date    Colorectal Cancer Screening      Colonoscopy Screen or Procedure      Annual Monitoring of Persistent     Medications (ACE/ARB, digoxin diuretics, anticonvulsants.)    Potassium  Annually Potassium (mmol/L)   Date Value   02/22/2018 3.9     POTASSIUM (P) (mmol/L)   Date Value   02/09/2016 4.0    No flowshee

## 2018-02-28 NOTE — PATIENT INSTRUCTIONS
You received your second Pneumovax today. Continue to get a flu shot every fall. Continue current medications. Return visit in 6 months.     [place] [id number] SCREENING SCHEDULE   Tests on this list are recommended by your physician but may not be covered, between ages 73-68)  No results found for this or any previous visit.  Limited to patients who meet one of the following criteria:   • Men who are 73-68 years old and have smoked more than 100 cigarettes in their lifetime   • Anyone with a family history renal disease   Hemophiliacs who received Factor VIII or IX concentrates   Clients of institutions for the mentally retarded   Persons who live in the same house as a HepB virus carrier   Homosexual men   Illicit injectable drug abusers     Tetanus Toxoid-

## 2018-03-02 RX ORDER — INSULIN GLARGINE 100 [IU]/ML
INJECTION, SOLUTION SUBCUTANEOUS
Qty: 10 ML | Refills: 5 | Status: SHIPPED | OUTPATIENT
Start: 2018-03-02 | End: 2018-06-30

## 2018-03-13 ENCOUNTER — NURSE TRIAGE (OUTPATIENT)
Dept: OTHER | Age: 73
End: 2018-03-13

## 2018-03-13 ENCOUNTER — OFFICE VISIT (OUTPATIENT)
Dept: INTERNAL MEDICINE CLINIC | Facility: CLINIC | Age: 73
End: 2018-03-13

## 2018-03-13 VITALS
HEIGHT: 72 IN | SYSTOLIC BLOOD PRESSURE: 136 MMHG | DIASTOLIC BLOOD PRESSURE: 73 MMHG | WEIGHT: 185 LBS | TEMPERATURE: 98 F | HEART RATE: 116 BPM | BODY MASS INDEX: 25.06 KG/M2

## 2018-03-13 DIAGNOSIS — J06.9 ACUTE URI: Primary | ICD-10-CM

## 2018-03-13 PROCEDURE — 99213 OFFICE O/P EST LOW 20 MIN: CPT | Performed by: INTERNAL MEDICINE

## 2018-03-13 PROCEDURE — G0463 HOSPITAL OUTPT CLINIC VISIT: HCPCS | Performed by: INTERNAL MEDICINE

## 2018-03-13 RX ORDER — AMOXICILLIN 875 MG/1
875 TABLET, COATED ORAL 2 TIMES DAILY
Qty: 14 TABLET | Refills: 0 | Status: SHIPPED | OUTPATIENT
Start: 2018-03-13 | End: 2018-03-20

## 2018-03-13 NOTE — TELEPHONE ENCOUNTER
Action Requested: Summary for Provider     []  Critical Lab, Recommendations Needed  [] Need Additional Advice  []   FYI    []   Need Orders  [x] Need Medications Sent to Pharmacy  []  Other     SUMMARY: Per pt c/o of cough x 2 weeks.  Cough better, current

## 2018-03-13 NOTE — PROGRESS NOTES
Diamond George is a 71year old male. Patient presents with:  Cough  Ear Pain: C/o dull ache in the right ear and right jaw since yesterday    HPI:   Since yesterday, he has had persistent right ear pain, improved today with Tylenol.   He also notes some hector VITAMIN OR Take one tablet by mouth daily Disp:  Rfl:        Ragweed                    Past Medical History:   Diagnosis Date   • Adenocarcinoma of prostate [facility]) May 2016    Shawn grade 3+3, PSA 5.5, s/p brachytherapy 9-16   • Colon polyps 12-98   • Celio Luciano

## 2018-03-13 NOTE — TELEPHONE ENCOUNTER
Spoke with patient (identified name and ), message reviewed and agrees with plan.    Appointment made for today 3/13/18 with Dr. Eagle Lorenz

## 2018-04-04 ENCOUNTER — MED REC SCAN ONLY (OUTPATIENT)
Dept: INTERNAL MEDICINE CLINIC | Facility: CLINIC | Age: 73
End: 2018-04-04

## 2018-04-20 ENCOUNTER — APPOINTMENT (OUTPATIENT)
Dept: LAB | Age: 73
End: 2018-04-20
Attending: UROLOGY
Payer: MEDICARE

## 2018-04-20 DIAGNOSIS — C61 PROSTATE CANCER (HCC): ICD-10-CM

## 2018-04-20 PROCEDURE — 84153 ASSAY OF PSA TOTAL: CPT

## 2018-04-20 PROCEDURE — 36415 COLL VENOUS BLD VENIPUNCTURE: CPT

## 2018-04-30 ENCOUNTER — OFFICE VISIT (OUTPATIENT)
Dept: SURGERY | Facility: CLINIC | Age: 73
End: 2018-04-30

## 2018-04-30 VITALS
BODY MASS INDEX: 24.92 KG/M2 | WEIGHT: 184 LBS | HEIGHT: 72 IN | DIASTOLIC BLOOD PRESSURE: 64 MMHG | RESPIRATION RATE: 16 BRPM | TEMPERATURE: 98 F | HEART RATE: 84 BPM | SYSTOLIC BLOOD PRESSURE: 132 MMHG

## 2018-04-30 DIAGNOSIS — R35.0 BENIGN PROSTATIC HYPERPLASIA WITH URINARY FREQUENCY: ICD-10-CM

## 2018-04-30 DIAGNOSIS — R35.1 NOCTURIA: ICD-10-CM

## 2018-04-30 DIAGNOSIS — R39.15 URINARY URGENCY: ICD-10-CM

## 2018-04-30 DIAGNOSIS — C61 PROSTATE CANCER (HCC): Primary | ICD-10-CM

## 2018-04-30 DIAGNOSIS — N40.1 BENIGN PROSTATIC HYPERPLASIA WITH URINARY FREQUENCY: ICD-10-CM

## 2018-04-30 PROCEDURE — G0463 HOSPITAL OUTPT CLINIC VISIT: HCPCS | Performed by: UROLOGY

## 2018-04-30 PROCEDURE — 99214 OFFICE O/P EST MOD 30 MIN: CPT | Performed by: UROLOGY

## 2018-04-30 NOTE — PATIENT INSTRUCTIONS
1.  To investigate worsening in urination problem, please submit urine specimen for complete urinalysis and urine culture; also please schedule bladder ultrasound--check how well you are emptying your bladder and screen for any bladder diseases.   We will n

## 2018-04-30 NOTE — PROGRESS NOTES
HPI:    Patient ID: Raz Freedman is a 71year old male. HPI    1.   Voiding Dysfunction  Patient has current AUA score of 19, moderate voiding dysfunction category, worse compared to previous score of 10, moderate voiding dysfunction category, on 10/23/ No bladder tumors.   Sent home on ciprofloxacin,  Phenazopyridine, Tamsulosin, Medrol dose pack. 10/12/16 Pt chose not to start tamuslosin. 4/17/17 office visit with Dr. Jade July; prostate nml, 20 grams, no nodules or indurations.  10/23/2017 office visit with giles Right      Comment: Right foot  8/25/2015: INGUINAL HERNIA REPAIR Right  1983: VASECTOMY   Family History   Problem Relation Age of Onset   • Colon Cancer Mother    • Diabetes Father    • Heart Disease Father      CABG age 72   • Hypertension Father    • H well-nourished. No distress. HENT:   Head: Normocephalic and atraumatic. Eyes: EOM are normal.   Neck: Normal range of motion. Pulmonary/Chest: Effort normal.   Genitourinary:   Genitourinary Comments:   On YOLY, prostate wide but flat, 0-1+ enlarged, follow up in 6 months with PSA before the visit. I explained to the pt that the heart healthy diet is the prostate healthy diet -- he understands and agrees.    (R35.1) Nocturia  Pt has current AUA score of 19, moderate voiding dysfunction category.  The pr alternatives to the above treatment options listed above, and I answered questions concerning them; patient understands all of this and decides to proceed with the following:       Treatment Plan & Patient Instructions    1.   To investigate worsening in ur reflects my personal performance and is accurate and complete.   Yaron Croft MD, 4/30/2018, 1:15 PM

## 2018-05-02 RX ORDER — LISINOPRIL AND HYDROCHLOROTHIAZIDE 20; 12.5 MG/1; MG/1
TABLET ORAL
Qty: 90 TABLET | Refills: 1 | Status: SHIPPED | OUTPATIENT
Start: 2018-05-02 | End: 2018-08-28 | Stop reason: CLARIF

## 2018-05-02 RX ORDER — GLIMEPIRIDE 4 MG/1
TABLET ORAL
Qty: 180 TABLET | Refills: 1 | Status: SHIPPED | OUTPATIENT
Start: 2018-05-02 | End: 2018-10-28

## 2018-05-02 RX ORDER — AMLODIPINE BESYLATE 10 MG/1
TABLET ORAL
Qty: 90 TABLET | Refills: 1 | Status: SHIPPED | OUTPATIENT
Start: 2018-05-02 | End: 2018-10-28

## 2018-05-02 RX ORDER — ATORVASTATIN CALCIUM 40 MG/1
TABLET, FILM COATED ORAL
Qty: 90 TABLET | Refills: 1 | Status: SHIPPED | OUTPATIENT
Start: 2018-05-02 | End: 2018-08-28 | Stop reason: CLARIF

## 2018-05-04 RX ORDER — PIOGLITAZONEHYDROCHLORIDE 30 MG/1
TABLET ORAL
Qty: 90 TABLET | Refills: 1 | Status: SHIPPED | OUTPATIENT
Start: 2018-05-04 | End: 2018-10-28

## 2018-05-09 ENCOUNTER — HOSPITAL ENCOUNTER (OUTPATIENT)
Dept: ULTRASOUND IMAGING | Age: 73
Discharge: HOME | End: 2018-05-09
Attending: UROLOGY
Payer: MEDICARE

## 2018-05-09 DIAGNOSIS — R39.15 URINARY URGENCY: ICD-10-CM

## 2018-05-09 DIAGNOSIS — R35.1 NOCTURIA: ICD-10-CM

## 2018-05-09 PROCEDURE — 76857 US EXAM PELVIC LIMITED: CPT | Performed by: UROLOGY

## 2018-05-10 ENCOUNTER — APPOINTMENT (OUTPATIENT)
Dept: LAB | Age: 73
End: 2018-05-10
Attending: UROLOGY
Payer: MEDICARE

## 2018-05-10 DIAGNOSIS — R35.1 NOCTURIA: ICD-10-CM

## 2018-05-10 DIAGNOSIS — R39.15 URINARY URGENCY: ICD-10-CM

## 2018-05-10 PROCEDURE — 81001 URINALYSIS AUTO W/SCOPE: CPT

## 2018-05-10 PROCEDURE — 87086 URINE CULTURE/COLONY COUNT: CPT

## 2018-06-30 RX ORDER — INSULIN GLARGINE 100 [IU]/ML
INJECTION, SOLUTION SUBCUTANEOUS
Qty: 10 ML | Refills: 5 | Status: SHIPPED | OUTPATIENT
Start: 2018-06-30 | End: 2018-10-14

## 2018-07-17 ENCOUNTER — TELEPHONE (OUTPATIENT)
Dept: SURGERY | Facility: CLINIC | Age: 73
End: 2018-07-17

## 2018-08-09 ENCOUNTER — TELEPHONE (OUTPATIENT)
Dept: SURGERY | Facility: CLINIC | Age: 73
End: 2018-08-09

## 2018-08-23 ENCOUNTER — APPOINTMENT (OUTPATIENT)
Dept: LAB | Age: 73
End: 2018-08-23
Attending: INTERNAL MEDICINE
Payer: MEDICARE

## 2018-08-23 DIAGNOSIS — E11.9 TYPE 2 DIABETES MELLITUS WITHOUT COMPLICATION, UNSPECIFIED WHETHER LONG TERM INSULIN USE (HCC): ICD-10-CM

## 2018-08-23 LAB — HBA1C MFR BLD: 8 % (ref 4–6)

## 2018-08-23 PROCEDURE — 36415 COLL VENOUS BLD VENIPUNCTURE: CPT

## 2018-08-23 PROCEDURE — 83036 HEMOGLOBIN GLYCOSYLATED A1C: CPT

## 2018-08-28 ENCOUNTER — OFFICE VISIT (OUTPATIENT)
Dept: INTERNAL MEDICINE CLINIC | Facility: CLINIC | Age: 73
End: 2018-08-28

## 2018-08-28 VITALS
WEIGHT: 183 LBS | HEIGHT: 72 IN | HEART RATE: 98 BPM | BODY MASS INDEX: 24.79 KG/M2 | SYSTOLIC BLOOD PRESSURE: 128 MMHG | DIASTOLIC BLOOD PRESSURE: 64 MMHG

## 2018-08-28 DIAGNOSIS — C61 ADENOCARCINOMA OF PROSTATE (HCC): ICD-10-CM

## 2018-08-28 DIAGNOSIS — Z79.4 TYPE 2 DIABETES MELLITUS WITHOUT COMPLICATION, WITH LONG-TERM CURRENT USE OF INSULIN (HCC): Primary | ICD-10-CM

## 2018-08-28 DIAGNOSIS — E11.9 TYPE 2 DIABETES MELLITUS WITHOUT COMPLICATION, WITH LONG-TERM CURRENT USE OF INSULIN (HCC): Primary | ICD-10-CM

## 2018-08-28 DIAGNOSIS — I10 ESSENTIAL HYPERTENSION: ICD-10-CM

## 2018-08-28 PROCEDURE — 99213 OFFICE O/P EST LOW 20 MIN: CPT | Performed by: INTERNAL MEDICINE

## 2018-08-28 NOTE — PROGRESS NOTES
Angelina Ruth is a 71year old male. Patient presents with:  Test Results: Would like to discuss A1c results from 8/23    HPI:   Mr. Robin Thomas presents this morning for six-month follow-up of type 2 diabetes and hypertension.     Glycohemoglobin earlier this mon (VITAMIN C) 1000 MG Oral Tab Take 1,000 mg by mouth daily. Disp:  Rfl:    aspirin 81 MG Oral Tab Take 81 mg by mouth daily.  Disp:  Rfl:    Glucose Blood (SYEDA CONTOUR TEST) In Vitro Strip To Test once a day Disp:  Rfl:    MULTIPLE VITAMIN OR Take one tabl optimally controlled. Continue current medications. Resume regular exercise. Reinforced annual influenza vaccine. Return visit in 6 months for recheck, physical and complete labs. 2. Essential hypertension  Well-controlled    3.  Adenocarcinoma of pr

## 2018-08-28 NOTE — PATIENT INSTRUCTIONS
Please continue current medications. Resume regular exercise. Schedule a physical in February. Remember to get a flu shot soon.

## 2018-10-01 ENCOUNTER — PATIENT MESSAGE (OUTPATIENT)
Dept: INTERNAL MEDICINE CLINIC | Facility: CLINIC | Age: 73
End: 2018-10-01

## 2018-10-01 NOTE — TELEPHONE ENCOUNTER
From: Prema Gottlieb  To: Yoon Valles MD  Sent: 10/1/2018 12:02 PM CDT  Subject: Non-Urgent Medical Question    Dr Ambrose Child    We have been invited to visit our son and his family in [facility] over the Christmas holiday.  We have been advised that there

## 2018-10-05 ENCOUNTER — PATIENT MESSAGE (OUTPATIENT)
Dept: INTERNAL MEDICINE CLINIC | Facility: CLINIC | Age: 73
End: 2018-10-05

## 2018-10-05 DIAGNOSIS — Z71.84 TRAVEL ADVICE ENCOUNTER: Primary | ICD-10-CM

## 2018-10-05 NOTE — TELEPHONE ENCOUNTER
From: Tylor Mora  To: Rosi Duncan MD  Sent: 10/5/2018 9:48 AM CDT  Subject: Referral Request    Dr Campbell Young    [facility] and before I can make an appointment I need a referral.    Thanks  Jayme White

## 2018-10-14 RX ORDER — INSULIN GLARGINE 100 [IU]/ML
INJECTION, SOLUTION SUBCUTANEOUS
Qty: 10 ML | Refills: 5 | Status: SHIPPED | OUTPATIENT
Start: 2018-10-14 | End: 2019-02-11

## 2018-10-18 RX ORDER — SYRINGE-NEEDLE,INSULIN,0.5 ML 31 GX5/16"
SYRINGE, EMPTY DISPOSABLE MISCELLANEOUS
Qty: 100 EACH | Refills: 5 | Status: SHIPPED | OUTPATIENT
Start: 2018-10-18 | End: 2019-02-11

## 2018-10-22 ENCOUNTER — APPOINTMENT (OUTPATIENT)
Dept: LAB | Age: 73
End: 2018-10-22
Attending: UROLOGY
Payer: MEDICARE

## 2018-10-22 DIAGNOSIS — C61 PROSTATE CANCER (HCC): ICD-10-CM

## 2018-10-22 PROCEDURE — 36415 COLL VENOUS BLD VENIPUNCTURE: CPT

## 2018-10-22 PROCEDURE — 84153 ASSAY OF PSA TOTAL: CPT

## 2018-10-28 RX ORDER — PIOGLITAZONEHYDROCHLORIDE 30 MG/1
TABLET ORAL
Qty: 90 TABLET | Refills: 1 | Status: SHIPPED | OUTPATIENT
Start: 2018-10-28 | End: 2019-02-11

## 2018-10-28 RX ORDER — GLIMEPIRIDE 4 MG/1
TABLET ORAL
Qty: 180 TABLET | Refills: 1 | Status: SHIPPED | OUTPATIENT
Start: 2018-10-28 | End: 2019-02-11

## 2018-10-28 RX ORDER — AMLODIPINE BESYLATE 10 MG/1
TABLET ORAL
Qty: 90 TABLET | Refills: 1 | Status: SHIPPED | OUTPATIENT
Start: 2018-10-28 | End: 2019-02-11

## 2018-10-28 RX ORDER — LISINOPRIL AND HYDROCHLOROTHIAZIDE 20; 12.5 MG/1; MG/1
TABLET ORAL
Qty: 90 TABLET | Refills: 1 | Status: SHIPPED | OUTPATIENT
Start: 2018-10-28 | End: 2019-02-11

## 2018-10-28 RX ORDER — ATORVASTATIN CALCIUM 40 MG/1
TABLET, FILM COATED ORAL
Qty: 90 TABLET | Refills: 1 | Status: SHIPPED | OUTPATIENT
Start: 2018-10-28 | End: 2019-02-11

## 2018-11-01 ENCOUNTER — TELEPHONE (OUTPATIENT)
Dept: SURGERY | Facility: CLINIC | Age: 73
End: 2018-11-01

## 2018-11-01 NOTE — TELEPHONE ENCOUNTER
Was asked by nurse manager to phone pt and reschedule his appt at 's request. Phoned pt and spoke with him. He graciously accepted new appt, and he requested the second week in January. New appt arranged in epic.  Informed pt of psa results, as result

## 2019-02-11 PROBLEM — Z86.010 HISTORY OF COLON POLYPS: Status: ACTIVE | Noted: 2019-02-11

## 2019-02-11 PROBLEM — K57.30 DIVERTICULOSIS OF LARGE INTESTINE WITHOUT HEMORRHAGE: Status: ACTIVE | Noted: 2019-02-11

## 2019-02-11 PROCEDURE — 84681 ASSAY OF C-PEPTIDE: CPT | Performed by: INTERNAL MEDICINE

## 2019-02-11 PROCEDURE — 82570 ASSAY OF URINE CREATININE: CPT | Performed by: INTERNAL MEDICINE

## 2019-02-11 PROCEDURE — 82043 UR ALBUMIN QUANTITATIVE: CPT | Performed by: INTERNAL MEDICINE

## 2019-02-11 PROCEDURE — 83516 IMMUNOASSAY NONANTIBODY: CPT | Performed by: INTERNAL MEDICINE

## 2019-02-11 PROCEDURE — 81001 URINALYSIS AUTO W/SCOPE: CPT | Performed by: INTERNAL MEDICINE

## 2019-04-24 RX ORDER — GLIMEPIRIDE 4 MG/1
TABLET ORAL
Qty: 180 TABLET | Refills: 0 | OUTPATIENT
Start: 2019-04-24

## 2019-04-24 RX ORDER — AMLODIPINE BESYLATE 10 MG/1
TABLET ORAL
Qty: 90 TABLET | Refills: 0 | OUTPATIENT
Start: 2019-04-24

## 2019-04-24 RX ORDER — ATORVASTATIN CALCIUM 40 MG/1
TABLET, FILM COATED ORAL
Qty: 90 TABLET | Refills: 0 | OUTPATIENT
Start: 2019-04-24

## 2019-04-24 RX ORDER — PIOGLITAZONEHYDROCHLORIDE 30 MG/1
TABLET ORAL
Qty: 90 TABLET | Refills: 0 | OUTPATIENT
Start: 2019-04-24

## 2019-04-24 RX ORDER — LISINOPRIL AND HYDROCHLOROTHIAZIDE 20; 12.5 MG/1; MG/1
TABLET ORAL
Qty: 90 TABLET | Refills: 0 | OUTPATIENT
Start: 2019-04-24

## 2019-04-29 PROBLEM — E11.65 TYPE 2 DIABETES MELLITUS WITH HYPERGLYCEMIA, WITHOUT LONG-TERM CURRENT USE OF INSULIN (HCC): Status: ACTIVE | Noted: 2019-04-29

## 2019-05-09 PROCEDURE — 81003 URINALYSIS AUTO W/O SCOPE: CPT | Performed by: INTERNAL MEDICINE

## 2019-12-04 PROBLEM — E11.9 TYPE 2 DIABETES MELLITUS WITHOUT COMPLICATION, WITH LONG-TERM CURRENT USE OF INSULIN (HCC): Status: ACTIVE | Noted: 2019-12-04

## 2019-12-04 PROBLEM — E11.29 TYPE 2 DIABETES MELLITUS WITH MICROALBUMINURIA, WITH LONG-TERM CURRENT USE OF INSULIN (HCC): Status: ACTIVE | Noted: 2019-12-04

## 2019-12-04 PROBLEM — Z79.4 TYPE 2 DIABETES MELLITUS WITH MICROALBUMINURIA, WITH LONG-TERM CURRENT USE OF INSULIN (HCC): Status: ACTIVE | Noted: 2019-12-04

## 2019-12-04 PROBLEM — Z79.4 TYPE 2 DIABETES MELLITUS WITHOUT COMPLICATION, WITH LONG-TERM CURRENT USE OF INSULIN (HCC): Status: ACTIVE | Noted: 2019-12-04

## 2019-12-04 PROBLEM — Z85.46 HISTORY OF PROSTATE CANCER: Status: ACTIVE | Noted: 2019-12-04

## 2019-12-04 PROBLEM — R80.9 TYPE 2 DIABETES MELLITUS WITH MICROALBUMINURIA, WITH LONG-TERM CURRENT USE OF INSULIN (HCC): Status: ACTIVE | Noted: 2019-12-04

## 2019-12-04 PROBLEM — E11.65 TYPE 2 DIABETES MELLITUS WITH HYPERGLYCEMIA, WITHOUT LONG-TERM CURRENT USE OF INSULIN (HCC): Status: RESOLVED | Noted: 2019-04-29 | Resolved: 2019-12-04

## 2021-02-19 PROBLEM — E11.42 DM TYPE 2 WITH DIABETIC PERIPHERAL NEUROPATHY (HCC): Status: ACTIVE | Noted: 2021-02-19

## 2022-02-09 PROBLEM — S72.002A: Status: ACTIVE | Noted: 2022-02-09

## 2022-08-02 NOTE — TELEPHONE ENCOUNTER
Jocelyn asking to [facility] fax referral for pt to see Dr. Memo Jones, [facility] fax to [phone].
Jocelyn calling back stating that the referral was received for Dr. Bobbi Lugo, but the procedure codes were very blurry and she cannot ready it. Manuel Taylor is requesting the fax be re-sent to #[phone]. Please advise.
(3) no apparent problem
I have personally seen and examined this patient.  I have fully participated in the care of this patient. I have reviewed all pertinent clinical information, including history, physical exam, plan and the Resident’s note and agree except as noted.

## 2022-10-22 NOTE — TELEPHONE ENCOUNTER
Requesting referral for PSA blood work and [place] after procedure[place] [facility] Tonja [place] Fax [phone]. Per Dipak Russo pt was there now with no referral. Advised is up to pt to decide since there is no referral ready. unintentional

## 2022-11-11 ENCOUNTER — TELEPHONE (OUTPATIENT)
Facility: CLINIC | Age: 77
End: 2022-11-11

## 2022-11-11 NOTE — TELEPHONE ENCOUNTER
----- Message from Steve Castle RN sent at 12/13/2017  8:46 AM CST -----  Regarding: recall colon  Recall colon in 5 years per GS.  Colon done 12/4/17

## 2024-05-08 NOTE — TELEPHONE ENCOUNTER
Patient is eligible for a 2018 Annual Medicare Physical Exam.   Left message to call back [phone]. Name band;

## (undated) DEVICE — Device: Brand: DEFENDO AIR/WATER/SUCTION AND BIOPSY VALVE

## (undated) DEVICE — SNARE OPTMZ PLPCTM TRP

## (undated) DEVICE — FORCEP RADIAL JAW 4

## (undated) DEVICE — ENDOSCOPY PACK - LOWER: Brand: MEDLINE INDUSTRIES, INC.

## (undated) DEVICE — SNARE CAPTIFLEX MICRO-OVL OLY

## (undated) NOTE — MR AVS SNAPSHOT
[facility]  [address]  [phone]               Thank you for choosing us for your health care visit with Iker Ragsdale MD.  We are glad to serve you and happy to provide you with this summ Generic drug:  Glucose Blood   To Test once a day           BD INSULIN SYR ULTRAFINE II 31G X 5/16\" 0.5 ML Misc   Generic drug:  Insulin Syringe-Needle U-100   USE FOR INJECTION OF INSULIN           glimepiride 4 MG Tabs   TAKE 1 TABLET (4 MG TOTAL) BY MO Make half your plate fruits and vegetables Highly refined, white starches including white bread, rice and pasta   Eat plenty of protein, keep the fat content low Sugars:  sodas and sports drinks, candies and desserts   Eat plenty of low-fat dairy products

## (undated) NOTE — LETTER
11/11/2022    [address] [id number]            Dear Gera Mackey,      Our records indicate that you are due for an appointment for a Colonoscopy with Gaviota Fernandez MD. Our doctors are booking out about 3-5 months for procedures. Please call our office to schedule a phone screening appointment to plan for the procedure(s). Your medical well-being is important to us. If your insurance requires a referral, please call your primary care office to request one.       Thank you,      The Physicians and Staff at [facility]